# Patient Record
Sex: MALE | Race: WHITE | Employment: FULL TIME | URBAN - METROPOLITAN AREA
[De-identification: names, ages, dates, MRNs, and addresses within clinical notes are randomized per-mention and may not be internally consistent; named-entity substitution may affect disease eponyms.]

---

## 2024-04-25 ENCOUNTER — HOSPITAL ENCOUNTER (EMERGENCY)
Facility: HOSPITAL | Age: 49
Discharge: HOME | End: 2024-04-25
Attending: EMERGENCY MEDICINE
Payer: COMMERCIAL

## 2024-04-25 ENCOUNTER — APPOINTMENT (OUTPATIENT)
Dept: CT IMAGING | Facility: HOSPITAL | Age: 49
End: 2024-04-25
Attending: EMERGENCY MEDICINE
Payer: COMMERCIAL

## 2024-04-25 VITALS
SYSTOLIC BLOOD PRESSURE: 134 MMHG | TEMPERATURE: 98 F | HEART RATE: 75 BPM | OXYGEN SATURATION: 97 % | DIASTOLIC BLOOD PRESSURE: 93 MMHG | RESPIRATION RATE: 18 BRPM

## 2024-04-25 DIAGNOSIS — E87.6 HYPOKALEMIA: ICD-10-CM

## 2024-04-25 DIAGNOSIS — G40.909 SEIZURE DISORDER (HCC): Primary | ICD-10-CM

## 2024-04-25 DIAGNOSIS — Z78.9 ALCOHOL USE: ICD-10-CM

## 2024-04-25 LAB
ALBUMIN SERPL-MCNC: 5 G/DL (ref 3.2–4.8)
ALBUMIN/GLOB SERPL: 1.5 {RATIO} (ref 1–2)
ALP LIVER SERPL-CCNC: 94 U/L
ALT SERPL-CCNC: 65 U/L
AMPHET UR QL SCN: NEGATIVE
ANION GAP SERPL CALC-SCNC: 16 MMOL/L (ref 0–18)
AST SERPL-CCNC: 67 U/L (ref ?–34)
ATRIAL RATE: 85 BPM
BARBITURATES UR QL SCN: NEGATIVE
BASOPHILS # BLD AUTO: 0.03 X10(3) UL (ref 0–0.2)
BASOPHILS NFR BLD AUTO: 0.3 %
BENZODIAZ UR QL SCN: NEGATIVE
BILIRUB SERPL-MCNC: 1.3 MG/DL (ref 0.3–1.2)
BUN BLD-MCNC: 13 MG/DL (ref 9–23)
BUN/CREAT SERPL: 13.7 (ref 10–20)
CALCIUM BLD-MCNC: 9.9 MG/DL (ref 8.7–10.4)
CHLORIDE SERPL-SCNC: 104 MMOL/L (ref 98–112)
CO2 SERPL-SCNC: 16 MMOL/L (ref 21–32)
COCAINE UR QL: NEGATIVE
CREAT BLD-MCNC: 0.95 MG/DL
CREAT UR-SCNC: 54.7 MG/DL
DEPRECATED RDW RBC AUTO: 41.6 FL (ref 35.1–46.3)
EGFRCR SERPLBLD CKD-EPI 2021: 98 ML/MIN/1.73M2 (ref 60–?)
EOSINOPHIL # BLD AUTO: 0.04 X10(3) UL (ref 0–0.7)
EOSINOPHIL NFR BLD AUTO: 0.5 %
ERYTHROCYTE [DISTWIDTH] IN BLOOD BY AUTOMATED COUNT: 12.2 % (ref 11–15)
ETHANOL SERPL-MCNC: <3 MG/DL (ref ?–3)
FENTANYL UR QL SCN: NEGATIVE
GLOBULIN PLAS-MCNC: 3.4 G/DL (ref 2.8–4.4)
GLUCOSE BLD-MCNC: 206 MG/DL (ref 70–99)
GLUCOSE BLDC GLUCOMTR-MCNC: 212 MG/DL (ref 70–99)
HCT VFR BLD AUTO: 43.9 %
HGB BLD-MCNC: 16.1 G/DL
IMM GRANULOCYTES # BLD AUTO: 0.05 X10(3) UL (ref 0–1)
IMM GRANULOCYTES NFR BLD: 0.6 %
LYMPHOCYTES # BLD AUTO: 2.93 X10(3) UL (ref 1–4)
LYMPHOCYTES NFR BLD AUTO: 33.6 %
MAGNESIUM SERPL-MCNC: 2.5 MG/DL (ref 1.6–2.6)
MCH RBC QN AUTO: 34.2 PG (ref 26–34)
MCHC RBC AUTO-ENTMCNC: 36.7 G/DL (ref 31–37)
MCV RBC AUTO: 93.2 FL
MDMA UR QL SCN: NEGATIVE
METHADONE UR QL SCN: NEGATIVE
MONOCYTES # BLD AUTO: 0.36 X10(3) UL (ref 0.1–1)
MONOCYTES NFR BLD AUTO: 4.1 %
NEUTROPHILS # BLD AUTO: 5.31 X10 (3) UL (ref 1.5–7.7)
NEUTROPHILS # BLD AUTO: 5.31 X10(3) UL (ref 1.5–7.7)
NEUTROPHILS NFR BLD AUTO: 60.9 %
OPIATES UR QL SCN: NEGATIVE
OSMOLALITY SERPL CALC.SUM OF ELEC: 288 MOSM/KG (ref 275–295)
OXYCODONE UR QL SCN: NEGATIVE
P AXIS: 17 DEGREES
P-R INTERVAL: 188 MS
PCP UR QL SCN: NEGATIVE
PLATELET # BLD AUTO: 271 10(3)UL (ref 150–450)
POTASSIUM SERPL-SCNC: 3.3 MMOL/L (ref 3.5–5.1)
PROT SERPL-MCNC: 8.4 G/DL (ref 5.7–8.2)
Q-T INTERVAL: 404 MS
QRS DURATION: 108 MS
QTC CALCULATION (BEZET): 480 MS
R AXIS: -9 DEGREES
RBC # BLD AUTO: 4.71 X10(6)UL
SODIUM SERPL-SCNC: 136 MMOL/L (ref 136–145)
T AXIS: 14 DEGREES
VENTRICULAR RATE: 85 BPM
WBC # BLD AUTO: 8.7 X10(3) UL (ref 4–11)

## 2024-04-25 PROCEDURE — 96374 THER/PROPH/DIAG INJ IV PUSH: CPT

## 2024-04-25 PROCEDURE — 96375 TX/PRO/DX INJ NEW DRUG ADDON: CPT

## 2024-04-25 PROCEDURE — 99285 EMERGENCY DEPT VISIT HI MDM: CPT

## 2024-04-25 PROCEDURE — 80053 COMPREHEN METABOLIC PANEL: CPT

## 2024-04-25 PROCEDURE — 83735 ASSAY OF MAGNESIUM: CPT | Performed by: EMERGENCY MEDICINE

## 2024-04-25 PROCEDURE — 82962 GLUCOSE BLOOD TEST: CPT

## 2024-04-25 PROCEDURE — 93010 ELECTROCARDIOGRAM REPORT: CPT

## 2024-04-25 PROCEDURE — 93005 ELECTROCARDIOGRAM TRACING: CPT

## 2024-04-25 PROCEDURE — 85025 COMPLETE CBC W/AUTO DIFF WBC: CPT

## 2024-04-25 PROCEDURE — 96361 HYDRATE IV INFUSION ADD-ON: CPT

## 2024-04-25 PROCEDURE — 80307 DRUG TEST PRSMV CHEM ANLYZR: CPT | Performed by: EMERGENCY MEDICINE

## 2024-04-25 PROCEDURE — 70450 CT HEAD/BRAIN W/O DYE: CPT | Performed by: EMERGENCY MEDICINE

## 2024-04-25 PROCEDURE — 82077 ASSAY SPEC XCP UR&BREATH IA: CPT

## 2024-04-25 RX ORDER — LORAZEPAM 1 MG/1
1 TABLET ORAL 2 TIMES DAILY PRN
Qty: 14 TABLET | Refills: 0 | Status: SHIPPED | OUTPATIENT
Start: 2024-04-25 | End: 2024-05-02

## 2024-04-25 RX ORDER — ACETAMINOPHEN 500 MG
1000 TABLET ORAL ONCE
Status: COMPLETED | OUTPATIENT
Start: 2024-04-25 | End: 2024-04-25

## 2024-04-25 RX ORDER — LEVETIRACETAM 500 MG/1
1000 TABLET ORAL 2 TIMES DAILY
Qty: 120 TABLET | Refills: 0 | Status: SHIPPED | OUTPATIENT
Start: 2024-04-25 | End: 2024-05-25

## 2024-04-25 RX ORDER — LORAZEPAM 2 MG/ML
1 INJECTION INTRAMUSCULAR ONCE
Status: COMPLETED | OUTPATIENT
Start: 2024-04-25 | End: 2024-04-25

## 2024-04-25 RX ORDER — SODIUM CHLORIDE 9 MG/ML
INJECTION, SOLUTION INTRAVENOUS ONCE
Status: COMPLETED | OUTPATIENT
Start: 2024-04-25 | End: 2024-04-25

## 2024-04-25 RX ORDER — LEVETIRACETAM 500 MG/5ML
1000 INJECTION, SOLUTION, CONCENTRATE INTRAVENOUS ONCE
Status: COMPLETED | OUTPATIENT
Start: 2024-04-25 | End: 2024-04-25

## 2024-04-25 RX ORDER — ONDANSETRON 2 MG/ML
4 INJECTION INTRAMUSCULAR; INTRAVENOUS ONCE
Status: COMPLETED | OUTPATIENT
Start: 2024-04-25 | End: 2024-04-25

## 2024-04-25 NOTE — ED QUICK NOTES
Per MD, Keppra to be started in ED. Patient resting comfortably with no signs of distress. Regular, unlabored breathing noted. Wife at bedside.

## 2024-04-25 NOTE — ED PROVIDER NOTES
Patient Seen in: [facility] Emergency Department      History     Chief Complaint   Patient presents with    Seizure Disorder     Stated Complaint: Seizure    Subjective:   49 y/o male presents with seizure at work.  He wife helps give history.  She was called that he had a seizure at work.  He went to work at 6 AM and was okay.  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  The witnesses of the seizure are not present.  His wife went to his place of work and then rode in negative ambulance with him here.  He did have a seizure x 2 approximately March 4 which was last month.  He was at a [place] facility for that.  He was not started on antiepileptic medications.  He said he did have a wound in the left side of his tongue from that seizure and he may have rebate the area and had some blood on his face.  He has no neck pain or weakness or numbness.  Mild headache.  No cough or congestion.  The last time he drank alcohol was about a day and a half ago.  He says when he does drink he drinks 4 small bottles of wine and was not being very forthcoming about how often he drinks but he says a few times a week.            Objective:   Past Medical History:    Hyperlipidemia    Seizure disorder (HCC)              Past Surgical History:   Procedure Laterality Date    Appendectomy                  Social History     Socioeconomic History    Marital status:    Tobacco Use    Smoking status: Some Days     Types: Cigarettes   Vaping Use    Vaping status: Some Days   Substance and Sexual Activity    Alcohol use: Yes    Drug use: Yes     Types: Cannabis     Comment: Vapin     Social Determinants of Health     Financial Resource Strain: Low Risk  (3/12/2024)    Received from [facility]    Overall Financial Resource Strain (CARDIA)     Difficulty of Paying Living Expenses: Not very hard   Food Insecurity: No Food Insecurity (3/12/2024)    Received from [facility]    Hunger Vital Sign     Worried About Running Out of Food in the Last Year: Never true     Ran Out of Food in the Last Year: Never true   Transportation Needs: No Transportation Needs (3/12/2024)    Received from [facility]    PRAPARE - Transportation     Lack of Transportation (Medical): No     Lack of Transportation (Non-Medical): No   Housing Stability: High Risk (3/28/2024)    Received from [facility]    Housing Stability Vital Sign     Unable to Pay for Housing in the Last Year: No     Number of Places Lived in the Last Year: 1     In the last 12 months, was there a time when you did not have a steady place to sleep or slept in a shelter (including now)?: Yes              Review of Systems    Positive for stated complaint: Seizure  Other systems are as noted in HPI.  Constitutional and vital signs reviewed.      All other systems reviewed and negative except as noted above.    Physical Exam     ED Triage Vitals [04/25/24 0916]   /84   Pulse 81   Resp 18   Temp 98.1 °F (36.7 °C)   Temp src Temporal   SpO2 99 %   O2 Device None (Room air)       Current:BP (!) 136/100   Pulse 87   Temp 98.1 °F (36.7 °C) (Temporal)   Resp 19   SpO2 98%         Physical Exam  HENT:      Head: Normocephalic and atraumatic.      Right Ear: External ear normal.      Left Ear: External ear normal.      Nose: Nose normal.      Mouth/Throat:      Mouth: Mucous membranes are moist.      Comments: Old appearing wound to the lateral left tongue  Eyes:      Extraocular Movements: Extraocular movements intact.      Pupils: Pupils are equal, round, and reactive to light.   Cardiovascular:      Rate and Rhythm: Normal rate and regular rhythm.      Pulses: Normal pulses.   Pulmonary:      Effort: Pulmonary effort is normal.      Breath sounds: Normal breath sounds.   Abdominal:      Palpations: Abdomen is soft.      Tenderness: There is no abdominal tenderness.   Musculoskeletal:          General: No swelling. Normal range of motion.      Cervical back: Normal range of motion and neck supple.   Lymphadenopathy:      Cervical: No cervical adenopathy.   Skin:     General: Skin is warm.      Capillary Refill: Capillary refill takes less than 2 seconds.   Neurological:      General: No focal deficit present.      Mental Status: He is alert.      Sensory: No sensory deficit.      Motor: No weakness.               ED Course     Labs Reviewed   COMP METABOLIC PANEL (14) - Abnormal; Notable for the following components:       Result Value    Glucose 206 (*)     Potassium 3.3 (*)     CO2 16.0 (*)     ALT 65 (*)     AST 67 (*)     Bilirubin, Total 1.3 (*)     Total Protein 8.4 (*)     Albumin 5.0 (*)     All other components within normal limits   DRUG ABUSE PANEL 11 SCREEN - Abnormal; Notable for the following components:    Cannabinoid Urine Presumed Positive (*)     All other components within normal limits   POCT GLUCOSE - Abnormal; Notable for the following components:    POC Glucose  212 (*)     All other components within normal limits   CBC W/ DIFFERENTIAL - Abnormal; Notable for the following components:    MCH 34.2 (*)     All other components within normal limits   ETHYL ALCOHOL - Normal   MAGNESIUM - Normal   CBC WITH DIFFERENTIAL WITH PLATELET    Narrative:     The following orders were created for panel order CBC With Differential With Platelet.  Procedure                               Abnormality         Status                     ---------                               -----------         ------                     CBC W/ DIFFERENTIAL[[id number]]          Abnormal            Final result                 Please view results for these tests on the individual orders.   RAINBOW DRAW LAVENDER   RAINBOW DRAW LIGHT GREEN   RAINBOW DRAW BLUE   RAINBOW DRAW GOLD     EKG    Rate, intervals and axes as noted on EKG Report.  Rate: 85  Rhythm: Sinus Rhythm  Reading:  voltage criteria for LVH, prolonged QT  QTc 480.  No ST elevation.  Abnormal EKG read by myself              ED Course as of 04/25/24 1444  ------------------------------------------------------------  Time: 04/25 1128  Comment: Labs independently interpreted by me.  Mild hypokalemia, mild transaminitis.  CBC normal, mag normal, alcohol less than 3.  Discussed with Dr. Parker from neurology would like to start Keppra and send him home on Keppra and get him neurology follow-up.  Vitals stable here.  He is not tachycardic.  ------------------------------------------------------------  Time: 04/25 1420  Comment: Doing well.  Discussed with Dr. Parker from neurology.  [place] he should be loaded with Keppra and placed on Keppra.  Did not think he needed admission.  Patient feels fine at this time.  Will give him resources for alcohol and also give some as needed Ativan.  He was given 1000 mg of Keppra here.  Ativan given here as well.              MDM      CT BRAIN OR HEAD (77821)    Result Date: 4/25/2024  CONCLUSION: No acute intracranial abnormality.    Dictated by (CST): Giancarlo Dodge MD on 4/25/2024 at 11:08 AM     Finalized by (CST): Giancarlo Dodge MD on 4/25/2024 at 11:09 AM                                            Medical Decision Making  Patient here with seizures.  He had a seizure about 6 weeks ago as well and had a negative workup.  Could be related to alcohol withdrawal.  Certainly epilepsy in the differential.  Not hypoglycemia as that was ruled out.  Electrolyte disturbances or toxic substances can cause problems as well.    Amount and/or Complexity of Data Reviewed  External Data Reviewed: radiology.     Details: MRI and EEG from 6 weeks ago at [place] reviewed.  Patient's results were unremarkable  Radiology: ordered and independent interpretation performed.     Details: No acute intracranial hemorrhage  Discussion of management or test interpretation with external provider(s): At Ativan and Keppra prescription written.  Patient doing well.   Heart rate remains normal.  Discussed with neurology.  Alcohol resources will be given by     Risk  Prescription drug management.  Parenteral controlled substances.        Disposition and Plan     Clinical Impression:  1. Seizure disorder (HCC)    2. Hypokalemia    3. Alcohol use         Disposition:  Discharge  4/25/2024  2:44 pm    Follow-up:  Elisabeth Farrar DO  [address] [address]  [phone]    Follow up            Medications Prescribed:  Current Discharge Medication List        START taking these medications    Details   levETIRAcetam 500 MG Oral Tab Take 2 tablets (1,000 mg total) by mouth 2 (two) times daily.  Qty: 120 tablet, Refills: 0      LORAZEPAM 1 MG Oral Tab Take 1 tablet (1 mg total) by mouth 2 (two) times daily as needed for Anxiety.  Qty: 14 tablet, Refills: 0    Associated Diagnoses: Seizure disorder (HCC); Hypokalemia; Alcohol use

## 2024-04-25 NOTE — DISCHARGE INSTRUCTIONS
Follow-up with one of the [place] neurologist as soon as possible in the office.  Call for an appointment.  Start taking the new medication Keppra tonight.  Avoid alcohol.  Read instructions and return to the closest emergency room for changes or worsening.  Follow-up with primary.

## 2024-04-25 NOTE — ED QUICK NOTES
Patient resting comfortably and sleeping in no signs of distress, O2 saturation did drop of 87% and was placed on 2L of O2 with improvement of saturation at 97%.

## 2024-04-25 NOTE — ED QUICK NOTES
Patient developed nausea and started vomiting red/brown mucus. MD aware and medication administered as prescribed. CIWA completed on patient.

## 2024-04-25 NOTE — ED INITIAL ASSESSMENT (HPI)
Patient arrived via EMS with c/o unwitnessed seizure. 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 Laceration to tongue. Patient has hx of alcohol abuse and seizure activity. Wife stated first seizure was on March 8th, undiagnosed. Seen by PCP no further follow-up care or medication ordered by PCP per patient.